# Patient Record
Sex: MALE | URBAN - METROPOLITAN AREA
[De-identification: names, ages, dates, MRNs, and addresses within clinical notes are randomized per-mention and may not be internally consistent; named-entity substitution may affect disease eponyms.]

---

## 2023-11-13 ENCOUNTER — OFFICE VISIT (OUTPATIENT)
Dept: URBAN - METROPOLITAN AREA CLINIC 113 | Facility: CLINIC | Age: 28
End: 2023-11-13
Payer: COMMERCIAL

## 2023-11-13 VITALS
SYSTOLIC BLOOD PRESSURE: 95 MMHG | DIASTOLIC BLOOD PRESSURE: 70 MMHG | TEMPERATURE: 97.5 F | WEIGHT: 132 LBS | BODY MASS INDEX: 21.3 KG/M2 | HEART RATE: 89 BPM

## 2023-11-13 DIAGNOSIS — R19.7 INTERMITTENT DIARRHEA: ICD-10-CM

## 2023-11-13 DIAGNOSIS — R11.2 NAUSEA AND VOMITING, UNSPECIFIED VOMITING TYPE: ICD-10-CM

## 2023-11-13 DIAGNOSIS — R14.0 ABDOMINAL BLOATING: ICD-10-CM

## 2023-11-13 DIAGNOSIS — R10.33 PERIUMBILICAL ABDOMINAL PAIN: ICD-10-CM

## 2023-11-13 PROBLEM — 62315008: Status: ACTIVE | Noted: 2023-11-13

## 2023-11-13 PROBLEM — 16932000: Status: ACTIVE | Noted: 2023-11-13

## 2023-11-13 PROBLEM — 443503005: Status: ACTIVE | Noted: 2023-11-13

## 2023-11-13 PROCEDURE — 99204 OFFICE O/P NEW MOD 45 MIN: CPT | Performed by: NURSE PRACTITIONER

## 2023-11-13 RX ORDER — ONDANSETRON 4 MG/1
1 TABLET ON THE TONGUE AND ALLOW TO DISSOLVE TABLET, ORALLY DISINTEGRATING ORAL
Qty: 30 | Refills: 3 | OUTPATIENT
Start: 2023-11-13

## 2023-11-13 RX ORDER — HYOSCYAMINE SULFATE 0.125 MG
1 - 2 TABLETS UNDER THE TONGUE AND ALLOW TO DISSOLVE TABLET,DISINTEGRATING ORAL
Qty: 60 | Refills: 3 | OUTPATIENT
Start: 2023-11-13 | End: 2024-03-12

## 2023-11-13 NOTE — HPI-TODAY'S VISIT:
This is a 28-year-old male with a history of chronic constipation presenting for evaluation of episodic abdominal pain, diarrhea, and nausea and vomiting. He reports a history of significant constipation.  He has been maintaining good bowel habits having 1-2 stools per day with intermittent fiber and dietary modification.  Earlier this year, he began having episodes of abdominal pain with diarrhea, occasionally associated with nausea and vomiting.  These episodes were occurring once every 2 months and are now occurring once a month.  He typically has a sensation late in the morning or in the early afternoon during which he feels as though his stomach is "off or hurting a little."  By the end of the evening, he has severe pain.  This is associated with at least 2 watery bowel movements and occasionally with nausea and vomiting.  By the next day, he has improved but does not feel completely well and by the second day, he is back to normal.  In between episodes, he has bloating and gas.  This is a chronic problem.  Occasionally, a small particle of food will become lodged in his throat which she did dislodges with a cough.  He otherwise has no abdominal symptoms.  He reports pain is crampy and in the periumbilical area.  He denies hematemesis.  He takes Benefiber once a week.  He denies a family history of Crohn's disease or ulcerative colitis. He considers himself lactose intolerant.  He avoids milk and ice cream.  He has occasional shredded cheese or feta cheese.  He has tried to identify dietary triggers but has been unable to do so. He had a significant episode last week for which he went to the emergency department at [place].  He was informed there may be inflammation in his colon.  He was prescribed hyoscyamine, Augmentin, and ondansetron.  He has not picked up his prescriptions.  He currently, he is completely asymptomatic. Records available on patient education sheet below.

## 2023-11-13 NOTE — HPI-OTHER HISTORIES
CT abdomen and pelvis with contrast 11/10/2023: Mild fecal matter within the right colon with remainder of the colon nearly empty with suggestion of mild wall thickening surrounding induration suggesting colitis. Mildly dilated air-filled small bowel loops in the anterior left upper abdomen and also within lower right abdomen without wall thickening or surrounding induration may represent mild ileus. Appendix not clearly seen though no CT evidence appendicitis. No other significant abnormality and specifically no CT evidence of obstructive uropathy.  Abdominal ultrasound 11/10/2023:Unremarkable limited right upper abdomen/gallbladder ultrasound.  Labs 11/10/2023:CBC: WBC 8.89, hemoglobin 15.6, MCV 85.8, platelet 151. BMP normal with exception of potassium 3.4. LFTs normal TB 1.1, ALP 65, ALT 19, AST 19. Lipase 40. Urinalysis notable for trace protein and 15 ketones.

## 2023-11-18 PROBLEM — 116289008: Status: ACTIVE | Noted: 2023-11-18

## 2023-11-23 LAB — CALPROTECTIN, FECAL: 14

## 2024-02-14 ENCOUNTER — OV NP (OUTPATIENT)
Dept: URBAN - METROPOLITAN AREA CLINIC 113 | Facility: CLINIC | Age: 29
End: 2024-02-14

## 2024-03-15 ENCOUNTER — OV EP (OUTPATIENT)
Dept: URBAN - METROPOLITAN AREA CLINIC 113 | Facility: CLINIC | Age: 29
End: 2024-03-15
Payer: COMMERCIAL

## 2024-03-15 VITALS
HEIGHT: 65 IN | TEMPERATURE: 97.7 F | SYSTOLIC BLOOD PRESSURE: 113 MMHG | HEART RATE: 67 BPM | WEIGHT: 138 LBS | BODY MASS INDEX: 22.99 KG/M2 | DIASTOLIC BLOOD PRESSURE: 61 MMHG | RESPIRATION RATE: 16 BRPM

## 2024-03-15 DIAGNOSIS — K62.5 BRIGHT RED BLOOD PER RECTUM: ICD-10-CM

## 2024-03-15 DIAGNOSIS — K58.0 IRRITABLE BOWEL SYNDROME WITH DIARRHEA: ICD-10-CM

## 2024-03-15 DIAGNOSIS — R10.33 PERIUMBILICAL ABDOMINAL PAIN: ICD-10-CM

## 2024-03-15 DIAGNOSIS — R14.0 ABDOMINAL BLOATING: ICD-10-CM

## 2024-03-15 DIAGNOSIS — R19.7 INTERMITTENT DIARRHEA: ICD-10-CM

## 2024-03-15 DIAGNOSIS — R11.2 NAUSEA AND VOMITING, UNSPECIFIED VOMITING TYPE: ICD-10-CM

## 2024-03-15 PROBLEM — 197125005: Status: ACTIVE | Noted: 2024-03-15

## 2024-03-15 PROCEDURE — 99214 OFFICE O/P EST MOD 30 MIN: CPT | Performed by: NURSE PRACTITIONER

## 2024-03-15 RX ORDER — ONDANSETRON 4 MG/1
1 TABLET ON THE TONGUE AND ALLOW TO DISSOLVE TABLET, ORALLY DISINTEGRATING ORAL
Qty: 30 | Refills: 3 | Status: ACTIVE | COMMUNITY
Start: 2023-11-13

## 2024-03-15 RX ORDER — HYOSCYAMINE SULFATE 0.125 MG
1 - 2 TABLETS UNDER THE TONGUE AND ALLOW TO DISSOLVE TABLET,DISINTEGRATING ORAL
Qty: 60 | Refills: 3 | OUTPATIENT
Start: 2024-03-15 | End: 2024-07-13

## 2024-03-15 RX ORDER — RIFAXIMIN 550 MG/1
1 TABLET TABLET ORAL THREE TIMES A DAY
Qty: 42 TABLET | Refills: 2 | OUTPATIENT
Start: 2024-03-15 | End: 2024-04-26

## 2024-03-15 RX ORDER — HYOSCYAMINE SULFATE 0.12 MG/1
1 TABLET AS NEEDED TABLET ORAL
Status: ACTIVE | COMMUNITY

## 2024-03-15 NOTE — HPI-TODAY'S VISIT:
This is a 28-year-old male with a history of chronic constipation, and episodic abdominal pain, diarrhea, and nausea and vomiting presenting for follow-up. He was initially seen 11/13/2023.  He reported a history of significant constipation.  He was maintaining 1 or 2 bowel movements per day with intermittent fiber and dietary modification.  Earlier in the year, he began to experience episodes of abdominal pain with diarrhea and occasional nausea and vomiting occurring every 2 months, increasing in frequency to once a month.  He had an unremarkable ultrasound and labs at an emergency department visit.  He had a CT scan showing evidence suggesting mild colitis in the left colon.  He had been prescribed Augmentin but had not started it because of symptom resolution.  He was instructed to hold off on antibiotics.  He was prescribed hyoscyamine to use early with symptom onset.  He was to use ondansetron if he developed nausea.  Stool fecal calprotectin was ordered to assess for evidence of inflammation.  Overflow from constipation was a consideration.  He was to start taking Benefiber 1 tablespoon daily.  He was instructed to keep a food and symptom diary and reduce foods high in fermentable sugar and gas producing foods due to a complaint of bloating.  Colonoscopy was a future consideration. Labs 11/17/2023:Stool calprotectin normal at 14. Since November 2023, he reports 3 episodes during which she had "bad cramps" with diarrhea and occasional nausea and vomiting.  Pain persist for 1 or 2 days.  Afterward, he does not have pain but has a sensation of "gross or rotten feeling in my stomach."  In between episodes, he has no symptoms.  His most recent episode occurred on 3/1/2024 and was mild.  He admits occasional red blood on the tissue that may be associated with frequent wiping.  He denies any other abdominal symptoms. He has increased yogurt and is taking probiotics without improvement.  He is taking a regular doses of Benefiber 2 tablespoons daily.  He denies any other abdominal symptoms. He has identified some association with spicy food although he admits that he will eat spicy food and not have symptoms.  He admits to eating foods high in fermentable sugars.  However, this does not seem to trigger symptoms.  He admits gas and bloating intermittently.  His weight is stable.

## 2024-03-18 LAB
IMMUNOGLOBULIN A: 216
TISSUE TRANSGLUTAMINASE AB, IGA: <1

## 2024-06-17 ENCOUNTER — DASHBOARD ENCOUNTERS (OUTPATIENT)
Age: 29
End: 2024-06-17

## 2024-06-17 ENCOUNTER — OFFICE VISIT (OUTPATIENT)
Dept: URBAN - METROPOLITAN AREA CLINIC 113 | Facility: CLINIC | Age: 29
End: 2024-06-17
Payer: COMMERCIAL

## 2024-06-17 VITALS
TEMPERATURE: 97.4 F | WEIGHT: 138 LBS | HEIGHT: 65 IN | HEART RATE: 73 BPM | BODY MASS INDEX: 22.99 KG/M2 | RESPIRATION RATE: 18 BRPM | DIASTOLIC BLOOD PRESSURE: 64 MMHG | SYSTOLIC BLOOD PRESSURE: 95 MMHG

## 2024-06-17 DIAGNOSIS — R19.7 INTERMITTENT DIARRHEA: ICD-10-CM

## 2024-06-17 DIAGNOSIS — K62.5 BRIGHT RED BLOOD PER RECTUM: ICD-10-CM

## 2024-06-17 DIAGNOSIS — R10.33 PERIUMBILICAL ABDOMINAL PAIN: ICD-10-CM

## 2024-06-17 PROCEDURE — 99213 OFFICE O/P EST LOW 20 MIN: CPT | Performed by: NURSE PRACTITIONER

## 2024-06-17 RX ORDER — HYOSCYAMINE SULFATE 0.125 MG
1 - 2 TABLETS UNDER THE TONGUE AND ALLOW TO DISSOLVE TABLET,DISINTEGRATING ORAL
Qty: 60 | Refills: 3 | Status: DISCONTINUED | COMMUNITY
Start: 2024-03-15 | End: 2024-07-13

## 2024-06-17 RX ORDER — HYOSCYAMINE SULFATE 0.12 MG/1
1 TABLET AS NEEDED TABLET ORAL
Status: ACTIVE | COMMUNITY

## 2024-06-17 RX ORDER — ONDANSETRON 4 MG/1
1 TABLET ON THE TONGUE AND ALLOW TO DISSOLVE TABLET, ORALLY DISINTEGRATING ORAL
Qty: 30 | Refills: 3 | Status: ACTIVE | COMMUNITY
Start: 2023-11-13

## 2024-06-17 NOTE — HPI-OTHER HISTORIES
Labs  Lab 3/15/2024: Celiac panel negative. 11/17/2023:Stool calprotectin normal at 14.  CT abdomen and pelvis with contrast 11/10/2023: Mild fecal matter within the right colon with remainder of the colon nearly empty with suggestion of mild wall thickening surrounding induration suggesting colitis. Mildly dilated air-filled small bowel loops in the anterior left upper abdomen and also within lower right abdomen without wall thickening or surrounding induration may represent mild ileus. Appendix not clearly seen though no CT evidence appendicitis. No other significant abnormality and specifically no CT evidence of obstructive uropathy.  Abdominal ultrasound 11/10/2023:Unremarkable limited right upper abdomen/gallbladder ultrasound.  Labs 11/10/2023:CBC: WBC 8.89, hemoglobin 15.6, MCV 85.8, platelet 151. BMP normal with exception of potassium 3.4. LFTs normal TB 1.1, ALP 65, ALT 19, AST 19. Lipase 40. Urinalysis notable for trace protein and 15 ketones.

## 2024-06-17 NOTE — HPI-TODAY'S VISIT:
This is a 28-year-old gentleman with a history of chronic constipation, episodic abdominal pain, diarrhea presenting for follow-up.  He was initially seen 3/15/2024.  He reported episodic symptoms that may be associated with dietary intolerance although he had not been able to identify food triggers.  He complained of abdominal bloating, nausea vomiting, periumbilical abdominal pain, intermittent diarrhea, and red blood per rectum.  His symptoms were consistent with irritable bowel syndrome.  He attributed red blood per rectum to frequent wiping.  Labs were ordered to assess for evidence of celiac disease.  Discussed colonoscopy to assess for colorectal pathology/inflammation.  He deferred colonoscopy.  He agreed to a course of rifaximin to treat possible small intestinal bacterial overgrowth.  He was to begin daily fiber and prescribed hyoscyamine for symptomatic relief of abdominal pain.  If symptoms did not resolve, he agreed to proceed with colonoscopy.  Since his last visit, he has not had diarrhea.  He did not take Xifaxan.  He is avoiding very spicy food and has increased fiber rich foods such as fruit and vegetables.  He takes Benefiber when he "remembers."  He denies abdominal pain or other abdominal symptoms.

## 2024-10-07 ENCOUNTER — WEB ENCOUNTER (OUTPATIENT)
Dept: URBAN - METROPOLITAN AREA CLINIC 113 | Facility: CLINIC | Age: 29
End: 2024-10-07

## 2024-10-08 ENCOUNTER — LAB OUTSIDE AN ENCOUNTER (OUTPATIENT)
Dept: URBAN - METROPOLITAN AREA CLINIC 113 | Facility: CLINIC | Age: 29
End: 2024-10-08

## 2024-11-01 ENCOUNTER — CLAIMS CREATED FROM THE CLAIM WINDOW (OUTPATIENT)
Dept: URBAN - METROPOLITAN AREA CLINIC 4 | Facility: CLINIC | Age: 29
End: 2024-11-01
Payer: COMMERCIAL

## 2024-11-01 ENCOUNTER — CLAIMS CREATED FROM THE CLAIM WINDOW (OUTPATIENT)
Dept: URBAN - METROPOLITAN AREA SURGERY CENTER 25 | Facility: SURGERY CENTER | Age: 29
End: 2024-11-01
Payer: COMMERCIAL

## 2024-11-01 DIAGNOSIS — K92.1 MELENA: ICD-10-CM

## 2024-11-01 DIAGNOSIS — D12.3 BENIGN NEOPLASM OF TRANSVERSE COLON: ICD-10-CM

## 2024-11-01 DIAGNOSIS — K63.5 BENIGN COLON POLYPS: ICD-10-CM

## 2024-11-01 DIAGNOSIS — K64.0 FIRST DEGREE HEMORRHOIDS: ICD-10-CM

## 2024-11-01 DIAGNOSIS — D12.3 ADENOMA OF TRANSVERSE COLON: ICD-10-CM

## 2024-11-01 PROCEDURE — 45385 COLONOSCOPY W/LESION REMOVAL: CPT | Performed by: INTERNAL MEDICINE

## 2024-11-01 PROCEDURE — 00811 ANES LWR INTST NDSC NOS: CPT | Performed by: ANESTHESIOLOGIST ASSISTANT

## 2024-11-01 PROCEDURE — 00811 ANES LWR INTST NDSC NOS: CPT | Performed by: ANESTHESIOLOGY

## 2024-11-01 PROCEDURE — 88305 TISSUE EXAM BY PATHOLOGIST: CPT | Performed by: PATHOLOGY

## 2024-11-01 RX ORDER — ONDANSETRON 4 MG/1
1 TABLET ON THE TONGUE AND ALLOW TO DISSOLVE TABLET, ORALLY DISINTEGRATING ORAL
Qty: 30 | Refills: 3 | Status: ACTIVE | COMMUNITY
Start: 2023-11-13

## 2024-11-01 RX ORDER — HYOSCYAMINE SULFATE 0.12 MG/1
1 TABLET AS NEEDED TABLET ORAL
Status: ACTIVE | COMMUNITY

## 2024-12-18 ENCOUNTER — OFFICE VISIT (OUTPATIENT)
Dept: URBAN - METROPOLITAN AREA CLINIC 113 | Facility: CLINIC | Age: 29
End: 2024-12-18
Payer: COMMERCIAL

## 2024-12-18 VITALS
WEIGHT: 142 LBS | SYSTOLIC BLOOD PRESSURE: 105 MMHG | HEIGHT: 65 IN | RESPIRATION RATE: 18 BRPM | TEMPERATURE: 97.9 F | BODY MASS INDEX: 23.66 KG/M2 | HEART RATE: 68 BPM | DIASTOLIC BLOOD PRESSURE: 64 MMHG

## 2024-12-18 DIAGNOSIS — K59.09 OTHER CONSTIPATION: ICD-10-CM

## 2024-12-18 DIAGNOSIS — R10.33 PERIUMBILICAL ABDOMINAL PAIN: ICD-10-CM

## 2024-12-18 DIAGNOSIS — Z86.0101 HISTORY OF ADENOMATOUS AND SERRATED COLON POLYPS: ICD-10-CM

## 2024-12-18 DIAGNOSIS — K62.5 BRIGHT RED BLOOD PER RECTUM: ICD-10-CM

## 2024-12-18 PROCEDURE — 99213 OFFICE O/P EST LOW 20 MIN: CPT | Performed by: NURSE PRACTITIONER

## 2024-12-18 RX ORDER — ONDANSETRON 4 MG/1
1 TABLET ON THE TONGUE AND ALLOW TO DISSOLVE TABLET, ORALLY DISINTEGRATING ORAL
Qty: 30 | Refills: 3 | Status: ACTIVE | COMMUNITY
Start: 2023-11-13

## 2024-12-18 RX ORDER — HYOSCYAMINE SULFATE 0.12 MG/1
1 TABLET AS NEEDED TABLET ORAL
Status: ACTIVE | COMMUNITY

## 2024-12-18 NOTE — HPI-TODAY'S VISIT:
This is a 29-year-old gentleman with a history of chronic constipation, episodic abdominal pain, intermittent diarrhea, and red blood per rectum presenting for follow-up.  He was last seen 6/17/2024.  He had a history of episodic symptoms including periumbilical abdominal pain, diarrhea, and red blood per rectum.  It was discussed his symptoms were possibly associated with IBS or dietary intolerance.  He had made dietary modification and had been asymptomatic since his last visit in March.  If symptoms recurred and were persistent, Xifaxan and colonoscopy were considerations.  He had hyoscyamine on hand should he require it.  He called the office 10/7/2024.  After discussion with his new primary care physician, he decided that he wanted to proceed with colonoscopy.  He admitted intermittent diarrhea and red blood per rectum.  Colonoscopy 11/1/2024:BBPS 9, removal of a 5 mm hepatic flexure sessile serrated adenoma.  Nonbleeding grade 1 internal hemorrhoids.  Otherwise normal.  Surveillance recommended in 2031.  He has not had an episode of abdominal pain and diarrhea since his last visit.  He is having a daily bowel movement.  He typically awakens early in the morning or, first thing in the morning, he has a very soft, small-volume bowel movement.  He feels constipated as evidenced by a sensation of incomplete evacuation and bloating.  He has recently restarted Benefiber 2 tablespoons daily and is observing a high-fiber diet.  His symptoms are persistent.  He use milk of magnesia in the past with some success.

## 2024-12-18 NOTE — HPI-OTHER HISTORIES
Colonoscopy 11/1/2024:BBPS 9, removal of a 5 mm hepatic flexure sessile serrated adenoma. Nonbleeding grade 1 internal hemorrhoids. Otherwise normal. Surveillance recommended in 2031.

## 2024-12-21 PROBLEM — 14760008: Status: ACTIVE | Noted: 2024-12-21

## 2024-12-21 PROBLEM — 428283002: Status: ACTIVE | Noted: 2024-12-21

## 2025-06-18 ENCOUNTER — OFFICE VISIT (OUTPATIENT)
Dept: URBAN - METROPOLITAN AREA CLINIC 113 | Facility: CLINIC | Age: 30
End: 2025-06-18